# Patient Record
Sex: FEMALE | Race: BLACK OR AFRICAN AMERICAN | NOT HISPANIC OR LATINO | URBAN - METROPOLITAN AREA
[De-identification: names, ages, dates, MRNs, and addresses within clinical notes are randomized per-mention and may not be internally consistent; named-entity substitution may affect disease eponyms.]

---

## 2020-10-27 ENCOUNTER — LAB OUTSIDE AN ENCOUNTER (OUTPATIENT)
Dept: URBAN - METROPOLITAN AREA CLINIC 105 | Facility: CLINIC | Age: 25
End: 2020-10-27

## 2020-10-27 ENCOUNTER — CLAIMS CREATED FROM THE CLAIM WINDOW (OUTPATIENT)
Dept: URBAN - METROPOLITAN AREA CLINIC 105 | Facility: CLINIC | Age: 25
End: 2020-10-27
Payer: COMMERCIAL

## 2020-10-27 ENCOUNTER — DASHBOARD ENCOUNTERS (OUTPATIENT)
Age: 25
End: 2020-10-27

## 2020-10-27 DIAGNOSIS — K59.01 CONSTIPATION: ICD-10-CM

## 2020-10-27 DIAGNOSIS — K92.1 RECTAL BLEEDING: ICD-10-CM

## 2020-10-27 DIAGNOSIS — K64.8 INTERNAL HEMORRHOIDS: ICD-10-CM

## 2020-10-27 PROCEDURE — G8483 FLU IMM NO ADMIN DOC REA: HCPCS | Performed by: INTERNAL MEDICINE

## 2020-10-27 PROCEDURE — G9903 PT SCRN TBCO ID AS NON USER: HCPCS | Performed by: INTERNAL MEDICINE

## 2020-10-27 PROCEDURE — 99204 OFFICE O/P NEW MOD 45 MIN: CPT | Performed by: INTERNAL MEDICINE

## 2020-10-27 PROCEDURE — G8427 DOCREV CUR MEDS BY ELIG CLIN: HCPCS | Performed by: INTERNAL MEDICINE

## 2020-10-27 PROCEDURE — G8417 CALC BMI ABV UP PARAM F/U: HCPCS | Performed by: INTERNAL MEDICINE

## 2020-10-27 RX ORDER — HYDROCORTISONE ACETATE 25 MG/1
1 SUPPOSITORY SUPPOSITORY RECTAL
Qty: 14 | Refills: 0 | OUTPATIENT
Start: 2020-10-27 | End: 2020-11-09

## 2020-10-27 RX ORDER — LINACLOTIDE 72 UG/1
1 CAPSULE AT LEAST 30 MINUTES BEFORE THE FIRST MEAL OF THE DAY ON AN EMPTY STOMACH CAPSULE, GELATIN COATED ORAL ONCE A DAY
Qty: 30 | Refills: 2 | OUTPATIENT
Start: 2020-10-27 | End: 2021-01-24

## 2020-10-27 NOTE — HPI-OTHER HISTORIES
The patient presents for constipation.  Today, the patient says she was referred by [facility]. Constipation has always been an issue. She has gone 7-8 days without a BM. She previously took Miralax 1 packet QD for 3 days without a benefit. She had also taken a Dulcolax supp once. She reports a recent hemorrhoidal flare-up. Symptoms include pain/itching at the anus and 1 episode of bleeding a month ago.

## 2020-10-28 PROBLEM — 14760008 CONSTIPATION: Status: ACTIVE | Noted: 2020-10-27

## 2020-10-28 LAB
A/G RATIO: 1.6
ALBUMIN: 4.4
ALKALINE PHOSPHATASE: 75
ALT (SGPT): 9
AST (SGOT): 13
BASO (ABSOLUTE): 0
BASOS: 1
BILIRUBIN, TOTAL: 0.3
BUN/CREATININE RATIO: 10
BUN: 11
CALCIUM: 9.9
CARBON DIOXIDE, TOTAL: 23
CHLORIDE: 106
CREATININE: 1.05
EGFR IF AFRICN AM: 84
EGFR IF NONAFRICN AM: 73
EOS (ABSOLUTE): 0
EOS: 1
GLOBULIN, TOTAL: 2.7
GLUCOSE: 86
HEMATOCRIT: 39.8
HEMATOLOGY COMMENTS:: (no result)
HEMOGLOBIN: 12.5
IMMATURE CELLS: (no result)
IMMATURE GRANS (ABS): 0
IMMATURE GRANULOCYTES: 0
LYMPHS (ABSOLUTE): 1.5
LYMPHS: 36
MCH: 28.3
MCHC: 31.4
MCV: 90
MONOCYTES(ABSOLUTE): 0.4
MONOCYTES: 10
NEUTROPHILS (ABSOLUTE): 2.1
NEUTROPHILS: 52
NRBC: (no result)
PLATELETS: 279
POTASSIUM: 4.5
PROTEIN, TOTAL: 7.1
RBC: 4.41
RDW: 12.4
SODIUM: 141
T4,FREE(DIRECT): 1.2
TSH: 2.04
WBC: 4